# Patient Record
Sex: FEMALE | Race: BLACK OR AFRICAN AMERICAN | NOT HISPANIC OR LATINO
[De-identification: names, ages, dates, MRNs, and addresses within clinical notes are randomized per-mention and may not be internally consistent; named-entity substitution may affect disease eponyms.]

---

## 2021-06-07 ENCOUNTER — RESULT REVIEW (OUTPATIENT)
Age: 31
End: 2021-06-07

## 2022-05-18 ENCOUNTER — APPOINTMENT (OUTPATIENT)
Dept: ORTHOPEDIC SURGERY | Facility: CLINIC | Age: 32
End: 2022-05-18
Payer: COMMERCIAL

## 2022-05-18 VITALS — WEIGHT: 193 LBS | BODY MASS INDEX: 34.2 KG/M2 | HEIGHT: 63 IN

## 2022-05-18 DIAGNOSIS — M54.50 LOW BACK PAIN, UNSPECIFIED: ICD-10-CM

## 2022-05-18 DIAGNOSIS — Z78.9 OTHER SPECIFIED HEALTH STATUS: ICD-10-CM

## 2022-05-18 PROBLEM — Z00.00 ENCOUNTER FOR PREVENTIVE HEALTH EXAMINATION: Status: ACTIVE | Noted: 2022-05-18

## 2022-05-18 PROCEDURE — 99204 OFFICE O/P NEW MOD 45 MIN: CPT

## 2022-05-18 PROCEDURE — 72070 X-RAY EXAM THORAC SPINE 2VWS: CPT

## 2022-05-18 RX ORDER — CYCLOBENZAPRINE HYDROCHLORIDE 10 MG/1
10 TABLET, FILM COATED ORAL
Qty: 30 | Refills: 0 | Status: ACTIVE | COMMUNITY
Start: 2022-05-18 | End: 1900-01-01

## 2022-05-18 RX ORDER — METHYLPREDNISOLONE 4 MG/1
4 TABLET ORAL
Qty: 1 | Refills: 0 | Status: ACTIVE | COMMUNITY
Start: 2022-05-18 | End: 1900-01-01

## 2022-05-18 NOTE — IMAGING
[de-identified] : Constitutional: well developed and well nourished, able to communicate, appears uncomfortable\par Cardiovascular: Peripheral vascular exam is grossly normal\par Neurologic: Alert and oriented, no acute distress.\par Skin: normal skin with no ulcers, rashes, or lesions\par Pulmonary: No respiratory distress, breathing comfortably on room air\par Lymphatics: No obvious lymphadenopathy or lymphedema in areas examined\par \par LUMBAR EXAM\par Alignment: normal\par Scoliosis: none\par Spinous process: no tenderness\par Thoracic paraspinal tenderness: pos tenderness\par Lumbar Paraspinal tenderness: No tenderness\par \par RANGE OF MOTION\par  diminished all planes\par \par MOTOR EXAM\par Hip Flexion: 5 /5\par Knee Extension: 5 /5\par Knee Flexion: 5 /5\par Ankle Dorsiflexion: 5 /5\par Ankle plantar flexion: 5 /5\par Toe Extension: 5 /5\par \par Straight leg sign: negative\par Sensation intact L2-S1 nerve root distribution\par Toes warm and well perfused\par \par IMAGING:\par 05/18/2022 Xrays of the thoracic spine were taken today demonstrating no fx/dislocation/compress fx

## 2022-05-18 NOTE — HISTORY OF PRESENT ILLNESS
[Upper back] : upper back [Gradual] : gradual [8] : 8 [Sharp] : sharp [Constant] : constant [Work] : work [Sleep] : sleep [Coughing] : coughing [de-identified] : 05/18/2022: a 32 yo female, presents for the evaluation of upper back pain noticed on 05/16/2022.  she started experiencing a nonradiating constant pain a day after lifting a heavy object. no neck pain/numbness/tingling/swelling/x-ray/specific treatment [] : no [FreeTextEntry9] : nothing [de-identified] : activities

## 2022-05-18 NOTE — ASSESSMENT
[FreeTextEntry1] : 31 year F with paraspinal strain.\par - physical therapy, NSAIDs, and MDP \par - Return in 6 weeks for follow up PRN

## 2022-11-10 ENCOUNTER — NON-APPOINTMENT (OUTPATIENT)
Age: 32
End: 2022-11-10

## 2023-08-26 ENCOUNTER — NON-APPOINTMENT (OUTPATIENT)
Age: 33
End: 2023-08-26

## 2023-12-06 ENCOUNTER — NON-APPOINTMENT (OUTPATIENT)
Age: 33
End: 2023-12-06

## 2024-02-06 ENCOUNTER — NON-APPOINTMENT (OUTPATIENT)
Age: 34
End: 2024-02-06

## 2024-12-27 ENCOUNTER — NON-APPOINTMENT (OUTPATIENT)
Age: 34
End: 2024-12-27